# Patient Record
Sex: MALE | Race: WHITE | NOT HISPANIC OR LATINO | Employment: OTHER | URBAN - NONMETROPOLITAN AREA
[De-identification: names, ages, dates, MRNs, and addresses within clinical notes are randomized per-mention and may not be internally consistent; named-entity substitution may affect disease eponyms.]

---

## 2023-02-21 ENCOUNTER — OFFICE VISIT (OUTPATIENT)
Dept: FAMILY MEDICINE CLINIC | Facility: CLINIC | Age: 48
End: 2023-02-21
Payer: MEDICARE

## 2023-02-21 VITALS
OXYGEN SATURATION: 96 % | HEIGHT: 66 IN | DIASTOLIC BLOOD PRESSURE: 80 MMHG | BODY MASS INDEX: 22.5 KG/M2 | SYSTOLIC BLOOD PRESSURE: 112 MMHG | HEART RATE: 97 BPM | TEMPERATURE: 97.1 F | RESPIRATION RATE: 16 BRPM | WEIGHT: 140 LBS

## 2023-02-21 DIAGNOSIS — Z00.00 ENCOUNTER FOR MEDICAL EXAMINATION TO ESTABLISH CARE: Primary | ICD-10-CM

## 2023-02-21 DIAGNOSIS — Z00.00 ANNUAL PHYSICAL EXAM: ICD-10-CM

## 2023-02-21 DIAGNOSIS — R56.9 SEIZURES: ICD-10-CM

## 2023-02-21 DIAGNOSIS — M81.0 AGE-RELATED OSTEOPOROSIS WITHOUT CURRENT PATHOLOGICAL FRACTURE: ICD-10-CM

## 2023-02-21 DIAGNOSIS — R79.9 ABNORMAL FINDING OF BLOOD CHEMISTRY, UNSPECIFIED: ICD-10-CM

## 2023-02-21 DIAGNOSIS — F41.8 OTHER SPECIFIED ANXIETY DISORDERS: ICD-10-CM

## 2023-02-21 PROBLEM — J30.89 ENVIRONMENTAL AND SEASONAL ALLERGIES: Status: ACTIVE | Noted: 2023-02-21

## 2023-02-21 PROBLEM — H52.223 REGULAR ASTIGMATISM OF BOTH EYES: Status: ACTIVE | Noted: 2023-02-21

## 2023-02-21 PROBLEM — E78.49 OTHER HYPERLIPIDEMIA: Status: ACTIVE | Noted: 2023-02-21

## 2023-02-21 PROBLEM — G80.0 SPASTIC QUADRIPLEGIC CEREBRAL PALSY: Status: ACTIVE | Noted: 2023-02-21

## 2023-02-21 PROBLEM — F98.1 PSYCHOGENIC FECAL INCONTINENCE: Status: ACTIVE | Noted: 2023-02-21

## 2023-02-21 PROBLEM — F73 PROFOUND INTELLECTUAL DISABILITY: Status: ACTIVE | Noted: 2023-02-21

## 2023-02-21 PROCEDURE — 99386 PREV VISIT NEW AGE 40-64: CPT | Performed by: PSYCHOLOGIST

## 2023-02-21 PROCEDURE — 99213 OFFICE O/P EST LOW 20 MIN: CPT | Performed by: PSYCHOLOGIST

## 2023-02-21 RX ORDER — LORATADINE 10 MG/1
10 CAPSULE, LIQUID FILLED ORAL
COMMUNITY
End: 2023-02-21 | Stop reason: SDUPTHER

## 2023-02-21 RX ORDER — LEVETIRACETAM 750 MG/1
750 TABLET ORAL 2 TIMES DAILY
Qty: 60 TABLET | Refills: 2 | Status: SHIPPED | OUTPATIENT
Start: 2023-02-21 | End: 2023-05-22

## 2023-02-21 RX ORDER — LORATADINE 10 MG/1
10 CAPSULE, LIQUID FILLED ORAL
Qty: 30 EACH | Refills: 2 | Status: SHIPPED | OUTPATIENT
Start: 2023-02-21

## 2023-02-21 RX ORDER — BUSPIRONE HYDROCHLORIDE 7.5 MG/1
7.5 TABLET ORAL 2 TIMES DAILY
COMMUNITY
End: 2023-02-21 | Stop reason: SDUPTHER

## 2023-02-21 RX ORDER — ZONISAMIDE 100 MG/1
100 CAPSULE ORAL 2 TIMES DAILY
Qty: 60 CAPSULE | Refills: 2 | Status: SHIPPED | OUTPATIENT
Start: 2023-02-21 | End: 2023-05-22

## 2023-02-21 RX ORDER — ZONISAMIDE 100 MG/1
100 CAPSULE ORAL 2 TIMES DAILY
COMMUNITY
End: 2023-02-21 | Stop reason: SDUPTHER

## 2023-02-21 RX ORDER — DIVALPROEX SODIUM 500 MG/1
500 TABLET, DELAYED RELEASE ORAL 2 TIMES DAILY
COMMUNITY
End: 2023-02-21 | Stop reason: SDUPTHER

## 2023-02-21 RX ORDER — LEVETIRACETAM 750 MG/1
750 TABLET ORAL 2 TIMES DAILY
COMMUNITY
End: 2023-02-21 | Stop reason: SDUPTHER

## 2023-02-21 RX ORDER — DIVALPROEX SODIUM 500 MG/1
500 TABLET, DELAYED RELEASE ORAL 2 TIMES DAILY
Qty: 60 TABLET | Refills: 2 | Status: SHIPPED | OUTPATIENT
Start: 2023-02-21 | End: 2023-03-16 | Stop reason: SDUPTHER

## 2023-02-21 RX ORDER — BUSPIRONE HYDROCHLORIDE 7.5 MG/1
7.5 TABLET ORAL 2 TIMES DAILY
Qty: 60 TABLET | Refills: 2 | Status: SHIPPED | OUTPATIENT
Start: 2023-02-21 | End: 2023-05-22

## 2023-02-21 RX ORDER — LORATADINE 10 MG/1
10 TABLET ORAL DAILY
COMMUNITY
End: 2023-02-21

## 2023-02-21 NOTE — PROGRESS NOTES
"Chief Complaint  Establish Care (Needs refill on rx and protocol updated.)    Subjective        Siddhartha Guzman presents to [facility] PRIMARY [facility]   C/o establish care as his PCP 2. Annual Physical 3. Chronic illness -- MENTAL DISABILITY  Seizures   This is a chronic problem. The problem has been gradually worsening. There were 6 to 10 seizures. The most recent episode lasted 30 to 120 seconds. Characteristics include eye blinking, bladder incontinence and rhythmic jerking. Characteristics do not include eye deviation or bowel incontinence. The episode was witnessed. The seizures did not continue in the ED. The seizure(s) had no focality. There has been no fever.       Objective   Vital Signs:  /80 (BP Location: Left arm, Patient Position: Sitting, Cuff Size: Adult)   Pulse 97   Temp 97.1 °F (36.2 °C) (Temporal)   Resp 16   Ht 167.6 cm (66\")   Wt 63.5 kg (140 lb)   SpO2 96%   BMI 22.60 kg/m²   Estimated body mass index is 22.6 kg/m² as calculated from the following:    Height as of this encounter: 167.6 cm (66\").    Weight as of this encounter: 63.5 kg (140 lb).    BMI is within normal parameters. No other follow-up for BMI required.      Physical Exam  Vitals and nursing note reviewed. Exam conducted with a chaperone present.   Constitutional:       General: He is awake.      Appearance: He is well-developed, well-groomed and normal weight.   HENT:      Head: Normocephalic and atraumatic.      Jaw: There is normal jaw occlusion.      Right Ear: Hearing, tympanic membrane, ear canal and external ear normal.      Left Ear: Hearing, tympanic membrane, ear canal and external ear normal.      Nose: Nose normal.      Mouth/Throat:      Lips: Pink.      Mouth: Mucous membranes are moist.      Pharynx: Oropharynx is clear.   Eyes:      General: Lids are normal. Vision grossly intact. Gaze aligned appropriately.      Extraocular Movements: Extraocular movements intact.      Conjunctiva/sclera: " Conjunctivae normal.      Pupils: Pupils are equal, round, and reactive to light.     Neck:      Trachea: Trachea and phonation normal.   Cardiovascular:      Rate and Rhythm: Normal rate and regular rhythm.      Pulses: Normal pulses.      Heart sounds: Normal heart sounds.   Pulmonary:      Effort: Pulmonary effort is normal.      Breath sounds: Normal breath sounds and air entry.   Abdominal:      General: Abdomen is flat. Bowel sounds are normal.      Palpations: Abdomen is soft.   Genitourinary:     Rectum: Normal.   Musculoskeletal:         General: Normal range of motion.      Right shoulder: Normal.      Left shoulder: Normal.      Right upper arm: Normal.      Left upper arm: Normal.      Right elbow: Normal.      Left elbow: Normal.      Right forearm: Normal.      Left forearm: Normal.      Right wrist: Normal.      Left wrist: Normal.      Right hand: Normal.      Left hand: Normal.      Cervical back: Normal, full passive range of motion without pain, normal range of motion and neck supple.      Thoracic back: Normal.      Lumbar back: Normal.      Right hip: Normal.      Left hip: Normal.      Right upper leg: Normal.      Left upper leg: Normal.      Right knee: Normal.      Left knee: Normal.      Right lower leg: Normal. No edema.      Left lower leg: Normal. No edema.      Right ankle: Normal.      Right Achilles Tendon: Normal.      Left ankle: Normal.      Left Achilles Tendon: Normal.      Right foot: Normal.      Left foot: Normal.   Lymphadenopathy:      Cervical: No cervical adenopathy.   Skin:     General: Skin is warm.      Capillary Refill: Capillary refill takes less than 2 seconds.   Neurological:      Mental Status: He is alert.      Cranial Nerves: Cranial nerves 2-12 are intact.      Sensory: Sensation is intact.      Motor: Motor function is intact.      Coordination: Coordination is intact.      Gait: Gait is intact.      Deep Tendon Reflexes: Reflexes are normal and symmetric.    Psychiatric:         Attention and Perception: Attention and perception normal.         Mood and Affect: Affect normal.         Speech: He is noncommunicative.         Behavior: Behavior is cooperative.         Cognition and Memory: Cognition and memory normal.        Result Review :  The following data was reviewed by: Karlos Reilly MD on 02/21/2023:   LABS FROM PREV PCP TO REVIEW WHEN MED RECORDS ARE RREQUESTED      Assessment and Plan   Diagnoses and all orders for this visit:    1. Encounter for medical examination to establish care (Primary)    2. Annual physical exam  -     CBC & Differential  -     Comprehensive Metabolic Panel  -     Hemoglobin A1c  -     Hepatitis C Antibody  -     Lipid Panel  -     TSH  -     Vitamin D,25-Hydroxy  -     Vitamin B12  -     Midazolam 5 MG/0.1ML solution; 0.1 mL into the nostril(s) as directed by provider As Needed (SEIZURES) for up to 30 days. 1 spray for seizures lasting 3 minutes or 2 or more seizure within 4 hours  Dispense: 1 each; Refill: 2    3. Seizures (HCC)  Assessment & Plan:  LAST SEIZURE ACTIVITY 3 DAYS AGO / SEEN IN ER 2-3 WEEKS AGO     Orders:  -     CBC & Differential  -     Comprehensive Metabolic Panel  -     Hemoglobin A1c  -     Hepatitis C Antibody  -     Lipid Panel  -     TSH  -     Vitamin D,25-Hydroxy  -     Vitamin B12  -     Midazolam 5 MG/0.1ML solution; 0.1 mL into the nostril(s) as directed by provider As Needed (SEIZURES) for up to 30 days. 1 spray for seizures lasting 3 minutes or 2 or more seizure within 4 hours  Dispense: 1 each; Refill: 2    4. Abnormal finding of blood chemistry, unspecified  -     Hemoglobin A1c  -     Lipid Panel    5. Other specified anxiety disorders  -     TSH    6. Age-related osteoporosis without current pathological fracture  -     Vitamin D,25-Hydroxy    Other orders  -     zonisamide (Zonegran) 100 MG capsule; Take 1 capsule by mouth 2 (Two) Times a Day for 90 days.  Dispense: 60 capsule; Refill: 2  -      Loratadine (Claritin) 10 MG capsule; Take 1 capsule by mouth Daily With Breakfast.  Dispense: 30 each; Refill: 2  -     levETIRAcetam (KEPPRA) 750 MG tablet; Take 1 tablet by mouth 2 (Two) Times a Day for 90 days.  Dispense: 60 tablet; Refill: 2  -     divalproex (DEPAKOTE) 500 MG DR tablet; Take 1 tablet by mouth 2 (Two) Times a Day for 90 days.  Dispense: 60 tablet; Refill: 2  -     busPIRone (BUSPAR) 7.5 MG tablet; Take 1 tablet by mouth 2 (Two) Times a Day for 90 days.  Dispense: 60 tablet; Refill: 2  -     mupirocin (BACTROBAN) 2 % ointment; Apply 1 application topically to the appropriate area as directed 3 (Three) Times a Day for 10 days.  Dispense: 1 each; Refill: 0    I spent 20 minutes caring for Siddhartha on this date of service. This time includes time spent by me in the following activities:reviewing tests, performing a medically appropriate examination and/or evaluation , counseling and educating the patient/family/caregiver, ordering medications, tests, or procedures and documenting information in the medical record     The preventive exam has been reviewed in detail.  The patient has been fully counseled on preventative guidelines for vaccines, cancer screenings, and other health maintenance needs.   The patient has been counseled on guidelines for maintaining a lifestyle to promote good health and to minimize chronic diseases.  The patient has been assisted with scheduling these healthcare procedures for the coming year and given a written document of health maintenance and anticipatory guidance for age with the AVS.       Follow Up   Return in about 3 months (around 5/21/2023) for Recheck, RTC FASTING LABS & lab only in thursday .  Patient was given instructions and counseling regarding his condition or for health maintenance advice. Please see specific information pulled into the AVS if appropriate.           This document has been electronically signed by Karlos Reilly MD  February 21,  2023 14:15 EST

## 2023-02-27 ENCOUNTER — LAB (OUTPATIENT)
Dept: FAMILY MEDICINE CLINIC | Facility: CLINIC | Age: 48
End: 2023-02-27
Payer: MEDICARE

## 2023-02-27 LAB
ALBUMIN SERPL-MCNC: 4.5 G/DL (ref 3.5–5.2)
ALBUMIN/GLOB SERPL: 1.2 G/DL
ALP SERPL-CCNC: 87 U/L (ref 39–117)
ALT SERPL W P-5'-P-CCNC: 22 U/L (ref 1–41)
ANION GAP SERPL CALCULATED.3IONS-SCNC: 10.2 MMOL/L (ref 5–15)
AST SERPL-CCNC: 23 U/L (ref 1–40)
BASOPHILS # BLD AUTO: 0.02 10*3/MM3 (ref 0–0.2)
BASOPHILS NFR BLD AUTO: 0.4 % (ref 0–1.5)
BILIRUB SERPL-MCNC: 0.4 MG/DL (ref 0–1.2)
BUN SERPL-MCNC: 21 MG/DL (ref 6–20)
BUN/CREAT SERPL: 33.3 (ref 7–25)
CALCIUM SPEC-SCNC: 10.2 MG/DL (ref 8.6–10.5)
CHLORIDE SERPL-SCNC: 104 MMOL/L (ref 98–107)
CHOLEST SERPL-MCNC: 196 MG/DL (ref 0–200)
CO2 SERPL-SCNC: 25.8 MMOL/L (ref 22–29)
CREAT SERPL-MCNC: 0.63 MG/DL (ref 0.76–1.27)
DEPRECATED RDW RBC AUTO: 49.2 FL (ref 37–54)
EGFRCR SERPLBLD CKD-EPI 2021: 118.1 ML/MIN/1.73
EOSINOPHIL # BLD AUTO: 0.04 10*3/MM3 (ref 0–0.4)
EOSINOPHIL NFR BLD AUTO: 0.9 % (ref 0.3–6.2)
ERYTHROCYTE [DISTWIDTH] IN BLOOD BY AUTOMATED COUNT: 13.9 % (ref 12.3–15.4)
GLOBULIN UR ELPH-MCNC: 3.9 GM/DL
GLUCOSE SERPL-MCNC: 77 MG/DL (ref 65–99)
HBA1C MFR BLD: 4.3 % (ref 4.8–5.6)
HCT VFR BLD AUTO: 47.1 % (ref 37.5–51)
HCV AB SER DONR QL: NORMAL
HDLC SERPL-MCNC: 53 MG/DL (ref 40–60)
HGB BLD-MCNC: 15.2 G/DL (ref 13–17.7)
IMM GRANULOCYTES # BLD AUTO: 0.01 10*3/MM3 (ref 0–0.05)
IMM GRANULOCYTES NFR BLD AUTO: 0.2 % (ref 0–0.5)
LDLC SERPL CALC-MCNC: 127 MG/DL (ref 0–100)
LDLC/HDLC SERPL: 2.36 {RATIO}
LYMPHOCYTES # BLD AUTO: 2.23 10*3/MM3 (ref 0.7–3.1)
LYMPHOCYTES NFR BLD AUTO: 48 % (ref 19.6–45.3)
MCH RBC QN AUTO: 30.8 PG (ref 26.6–33)
MCHC RBC AUTO-ENTMCNC: 32.3 G/DL (ref 31.5–35.7)
MCV RBC AUTO: 95.3 FL (ref 79–97)
MONOCYTES # BLD AUTO: 0.31 10*3/MM3 (ref 0.1–0.9)
MONOCYTES NFR BLD AUTO: 6.7 % (ref 5–12)
NEUTROPHILS NFR BLD AUTO: 2.04 10*3/MM3 (ref 1.7–7)
NEUTROPHILS NFR BLD AUTO: 43.8 % (ref 42.7–76)
NRBC BLD AUTO-RTO: 0 /100 WBC (ref 0–0.2)
PLATELET # BLD AUTO: 186 10*3/MM3 (ref 140–450)
PMV BLD AUTO: 10 FL (ref 6–12)
POTASSIUM SERPL-SCNC: 3.8 MMOL/L (ref 3.5–5.2)
PROT SERPL-MCNC: 8.4 G/DL (ref 6–8.5)
RBC # BLD AUTO: 4.94 10*6/MM3 (ref 4.14–5.8)
SODIUM SERPL-SCNC: 140 MMOL/L (ref 136–145)
TRIGL SERPL-MCNC: 90 MG/DL (ref 0–150)
TSH SERPL DL<=0.05 MIU/L-ACNC: 4.25 UIU/ML (ref 0.27–4.2)
VLDLC SERPL-MCNC: 16 MG/DL (ref 5–40)
WBC NRBC COR # BLD: 4.65 10*3/MM3 (ref 3.4–10.8)

## 2023-02-27 PROCEDURE — 82607 VITAMIN B-12: CPT | Performed by: PSYCHOLOGIST

## 2023-02-27 PROCEDURE — 80061 LIPID PANEL: CPT | Performed by: PSYCHOLOGIST

## 2023-02-27 PROCEDURE — 86803 HEPATITIS C AB TEST: CPT | Performed by: PSYCHOLOGIST

## 2023-02-27 PROCEDURE — 85025 COMPLETE CBC W/AUTO DIFF WBC: CPT | Performed by: PSYCHOLOGIST

## 2023-02-27 PROCEDURE — 80053 COMPREHEN METABOLIC PANEL: CPT | Performed by: PSYCHOLOGIST

## 2023-02-27 PROCEDURE — 83036 HEMOGLOBIN GLYCOSYLATED A1C: CPT | Performed by: PSYCHOLOGIST

## 2023-02-27 PROCEDURE — 82306 VITAMIN D 25 HYDROXY: CPT | Performed by: PSYCHOLOGIST

## 2023-02-27 PROCEDURE — 84443 ASSAY THYROID STIM HORMONE: CPT | Performed by: PSYCHOLOGIST

## 2023-02-28 LAB
25(OH)D3 SERPL-MCNC: 30.7 NG/ML (ref 30–100)
VIT B12 BLD-MCNC: 755 PG/ML (ref 211–946)

## 2023-03-14 ENCOUNTER — TELEPHONE (OUTPATIENT)
Dept: FAMILY MEDICINE CLINIC | Facility: CLINIC | Age: 48
End: 2023-03-14

## 2023-03-14 NOTE — TELEPHONE ENCOUNTER
Pharmacy Name:  [facility]     Pharmacy representative phone number:   [phone]    What medication are you calling in regards to:  divalproex (DEPAKOTE) 500 MG DR tablet    What question does the pharmacy have:  THE DEPAKOTE WAS WRITTEN FOR THE DR AND IT NORMALLY  IS FOR THE XR     Who is the provider that prescribed the medication:  DR WHITAKER     Additional notes: PLEASE SEND NEW PRESCRIPTION

## 2023-03-16 RX ORDER — DIVALPROEX SODIUM 500 MG/1
500 TABLET, DELAYED RELEASE ORAL 2 TIMES DAILY
Qty: 60 TABLET | Refills: 2 | Status: SHIPPED | OUTPATIENT
Start: 2023-03-16 | End: 2023-06-14

## 2023-03-17 ENCOUNTER — TELEPHONE (OUTPATIENT)
Dept: FAMILY MEDICINE CLINIC | Facility: CLINIC | Age: 48
End: 2023-03-17
Payer: MEDICARE

## 2023-03-17 NOTE — TELEPHONE ENCOUNTER
Attempted to call pt's guardian to inform her that Dr. Reilly switched pt's Depakote rx at the pharmacy to the correct one. Okay for hub to relay info.

## 2024-04-15 ENCOUNTER — TELEPHONE (OUTPATIENT)
Dept: GASTROENTEROLOGY | Facility: CLINIC | Age: 49
End: 2024-04-15
Payer: MEDICARE

## 2024-04-15 NOTE — TELEPHONE ENCOUNTER
Dr. Ricketts, I received this referral in my workque and the patient's diagnosis is Liver Lesion. I was just wondering if patient can wait til August or, would u want them to see Maryana?    Please and Thank you

## 2024-04-29 ENCOUNTER — OFFICE VISIT (OUTPATIENT)
Dept: GASTROENTEROLOGY | Facility: CLINIC | Age: 49
End: 2024-04-29
Payer: MEDICARE

## 2024-04-29 VITALS — WEIGHT: 138 LBS | HEIGHT: 66 IN | BODY MASS INDEX: 22.18 KG/M2

## 2024-04-29 DIAGNOSIS — K76.0 FATTY LIVER: ICD-10-CM

## 2024-04-29 DIAGNOSIS — R53.81 MALAISE AND FATIGUE: ICD-10-CM

## 2024-04-29 DIAGNOSIS — K76.9 LIVER LESION: Primary | ICD-10-CM

## 2024-04-29 DIAGNOSIS — R53.83 MALAISE AND FATIGUE: ICD-10-CM

## 2024-04-29 PROCEDURE — 80074 ACUTE HEPATITIS PANEL: CPT

## 2024-04-29 PROCEDURE — 82784 ASSAY IGA/IGD/IGG/IGM EACH: CPT

## 2024-04-29 PROCEDURE — 86258 DGP ANTIBODY EACH IG CLASS: CPT

## 2024-04-29 PROCEDURE — 86376 MICROSOMAL ANTIBODY EACH: CPT

## 2024-04-29 PROCEDURE — 82103 ALPHA-1-ANTITRYPSIN TOTAL: CPT

## 2024-04-29 PROCEDURE — 83540 ASSAY OF IRON: CPT

## 2024-04-29 PROCEDURE — 86231 EMA EACH IG CLASS: CPT

## 2024-04-29 PROCEDURE — 86015 ACTIN ANTIBODY EACH: CPT

## 2024-04-29 PROCEDURE — 82390 ASSAY OF CERULOPLASMIN: CPT

## 2024-04-29 PROCEDURE — 84466 ASSAY OF TRANSFERRIN: CPT

## 2024-04-29 PROCEDURE — 86381 MITOCHONDRIAL ANTIBODY EACH: CPT

## 2024-04-29 PROCEDURE — 80053 COMPREHEN METABOLIC PANEL: CPT

## 2024-04-29 PROCEDURE — 86364 TISS TRNSGLTMNASE EA IG CLAS: CPT

## 2024-04-29 PROCEDURE — 85025 COMPLETE CBC W/AUTO DIFF WBC: CPT

## 2024-04-29 PROCEDURE — 82728 ASSAY OF FERRITIN: CPT

## 2024-04-29 PROCEDURE — 86038 ANTINUCLEAR ANTIBODIES: CPT

## 2024-04-29 RX ORDER — ZONISAMIDE 100 MG/1
100 CAPSULE ORAL DAILY
COMMUNITY

## 2024-04-29 RX ORDER — DIVALPROEX SODIUM 500 MG/1
500 TABLET, EXTENDED RELEASE ORAL DAILY
COMMUNITY

## 2024-04-29 RX ORDER — LEVOTHYROXINE SODIUM 0.03 MG/1
25 TABLET ORAL
COMMUNITY

## 2024-04-29 RX ORDER — MIDAZOLAM 5 MG/.1ML
SPRAY NASAL
COMMUNITY

## 2024-04-29 RX ORDER — BUSPIRONE HYDROCHLORIDE 7.5 MG/1
7.5 TABLET ORAL 3 TIMES DAILY
COMMUNITY

## 2024-04-29 RX ORDER — MONTELUKAST SODIUM 10 MG/1
10 TABLET ORAL NIGHTLY
COMMUNITY

## 2024-04-29 NOTE — PROGRESS NOTES
Date of Consultation:  4/29/2024  Referring Physician: No ref. provider found    Chief Complaint  liver lesion    Siddhartha Guzman is a 48 y.o. male who presents today to [facility] for liver lesion.    HPI:   48-year-old male with past medical history of cerebral palsy and seizure disorder who presented today for consult of liver lesion.  CTA chest with and without contrast was performed at [facility] in [facility] on February 26, 2024.  This is notable for heterogeneous appearance of the liver with enhancing lesion along the lateral right lobe of the liver measuring 3 cm with concern for some degree of fatty infiltration.  Labs performed by his PCP in February 2024 was notable for hyperlipidemia with a total cholesterol of 240 and .  Platelets at this time were 313.  Repeat labs in March 2024 were notable for normal LFTs with AST 20, AST 59.  CBC was notable for thrombocytopenia with platelets of 124.  Patient was referred to [facility] gastroenterology for further workup and management of liver lesion.    Of note, patient has cerebral palsy and is incapable of serving as historian.  Caretakers, Poonam and son, accompany him to this visit.  However, these caretakers have only been with Mr. Guzman for the past month.  They are not familiar with his history outside of that timeframe.  They are unsure as asked to why they were referred to gastroenterology.  Per Poonam, patient has not had any weight loss, abdominal distention, abdominal pain, jaundice, rash, itching, increasing confusion, agitation, melena, hematochezia, change in bowel habits, nausea, or vomiting.  Patient has a bowel movement daily with BSS 3.  His appetite is good.  They are unsure if he has had colonoscopy in the past.      Previous History:   Past Medical History:   Diagnosis Date    Cerebral palsy     Seizures       History reviewed. No pertinent surgical history.   Social  "History     Socioeconomic History    Marital status: Single   Tobacco Use    Smoking status: Never    Smokeless tobacco: Never   Substance and Sexual Activity    Alcohol use: Never    Drug use: Never    Sexual activity: Defer        Current Medications:  Current Outpatient Medications   Medication Sig Dispense Refill    busPIRone (BUSPAR) 7.5 MG tablet Take 1 tablet by mouth 3 (Three) Times a Day.      cloBAZam 10 MG film Take  by mouth.      cloBAZam 20 MG film Take  by mouth.      divalproex (DEPAKOTE ER) 500 MG 24 hr tablet Take 1 tablet by mouth Daily.      levETIRAcetam 1000 MG Tablet 3DP Take  by mouth.      levothyroxine (SYNTHROID, LEVOTHROID) 25 MCG tablet Take 1 tablet by mouth Every Morning.      Loratadine (Claritin) 10 MG capsule Take 1 capsule by mouth Daily With Breakfast. 30 each 2    Midazolam (Nayzilam) 5 MG/0.1ML solution into the nostril(s) as directed by provider.      montelukast (SINGULAIR) 10 MG tablet Take 1 tablet by mouth Every Night.      zonisamide (ZONEGRAN) 100 MG capsule Take 1 capsule by mouth Daily.       No current facility-administered medications for this visit.       Allergies:   Allergies   Allergen Reactions    Penicillins Hives       Vitals:   Ht 167.6 cm (66\")   Wt 62.6 kg (138 lb)   BMI 22.27 kg/m²   Estimated body mass index is 22.27 kg/m² as calculated from the following:    Height as of this encounter: 167.6 cm (66\").    Weight as of this encounter: 62.6 kg (138 lb).    Physical Exam:   Physical Exam  Constitutional:       General: He is not in acute distress.     Appearance: Normal appearance. He is normal weight.   HENT:      Mouth/Throat:      Mouth: Mucous membranes are moist.   Eyes:      Extraocular Movements: Extraocular movements intact.   Cardiovascular:      Rate and Rhythm: Normal rate and regular rhythm.      Pulses: Normal pulses.      Heart sounds: Normal heart sounds.   Pulmonary:      Effort: Pulmonary effort is normal.      Breath sounds: Normal breath " sounds.   Abdominal:      General: Abdomen is flat. Bowel sounds are normal.      Palpations: Abdomen is soft.   Musculoskeletal:         General: Normal range of motion.   Skin:     General: Skin is warm and dry.   Neurological:      General: No focal deficit present.      Comments: Wheel-chair dependent    Psychiatric:      Comments: Significant cognitive deficits s/t cerebral palsy           Lab Results:   Lab Results   Component Value Date    WBC 4.70 03/18/2023    HGB 14.4 03/18/2023    HCT 42.6 03/18/2023    MCV 92 03/18/2023    RDW 12.9 03/18/2023     03/18/2023    NEUTRORELPCT 43.8 02/27/2023    LYMPHORELPCT 48.0 (H) 02/27/2023    MONORELPCT 6.7 02/27/2023    EOSRELPCT 0.9 02/27/2023    BASORELPCT 0.4 02/27/2023    NEUTROABS 2.04 02/27/2023    LYMPHSABS 2.23 02/27/2023       Lab Results   Component Value Date     02/27/2023    K 3.8 02/27/2023    CO2 25.8 02/27/2023     02/27/2023    BUN 21 (H) 02/27/2023    CREATININE 0.63 (L) 02/27/2023    GLUCOSE 77 02/27/2023    CALCIUM 10.2 02/27/2023    ALKPHOS 87 02/27/2023    AST 23 02/27/2023    ALT 22 02/27/2023    BILITOT 0.4 02/27/2023    ALBUMIN 4.5 02/27/2023    PROTEINTOT 8.4 02/27/2023       Pathology:        Endoscopy:        Imaging:    CTA chest with and without:   Very heterogeneous appearance of the liver with an enhancing lesion in the lateral right lobe  of liver measuring 3 cm with concerns for a degree of fatty infiltration       Results review: During today's encounter, all relevant clinical data has been reviewed.      Assessment and Plan    1. Liver lesion (Primary)  2. Malaise and fatigue  3. Fatty liver  Caretakers deny any unintentional weight loss, abdominal distention, jaundice, abdominal pain, changes in appetite, N/V/D, constipation, melena, hematochezia, easy bleeding.  Caretakers have only been with patient approximately 1 month, unsure of history prior to their assumption of care  CTA chest with without was notable for  liver lesion and concerns for degree of fatty infiltration  Prior labs notable for hyperlipidemia which would put patient at risk for fatty liver disease.  Will obtain liver ultrasound  Will obtain labs as listed below  -     CBC & Differential  -     Comprehensive Metabolic Panel  -     Alpha - 1 - Antitrypsin  -     BRIANA  -     Anti-microsomal Antibody  -     Anti-Smooth Muscle Antibody Titer  -     Celiac Comprehensive Panel  -     Ceruloplasmin  -     Mitochondrial Antibodies, M2  -     Hepatitis Panel, Acute  -     Endomysial Antibody IgA  -     Ferritin  -     Iron Profile  -     US Liver; Future        New Medications:   No orders of the defined types were placed in this encounter.      Discontinued Medications:   Medications Discontinued During This Encounter   Medication Reason    busPIRone (BUSPAR) 7.5 MG tablet *Therapy completed    divalproex (DEPAKOTE) 500 MG DR tablet *Therapy completed    levETIRAcetam (KEPPRA) 750 MG tablet *Therapy completed    zonisamide (Zonegran) 100 MG capsule *Therapy completed        Visit Diagnoses:    ICD-10-CM ICD-9-CM   1. Liver lesion  K76.9 573.8   2. Malaise and fatigue  R53.81 780.79    R53.83    3. Fatty liver  K76.0 571.8          Follow Up:   No follow-ups on file.    The patient was in agreement with the plan and all questions were answered to patient's satisfaction.        This document has been electronically signed by Maryana Hayes PA-C   April 29, 2024 14:06 EDT    Dictated Utilizing Dragon Dictation: Part of this note may be an electronic transcription/translation of spoken language to printed text using the Dragon Dictation System.    CC:  No ref. provider found  Provider, No Known

## 2024-04-30 LAB
ALBUMIN SERPL-MCNC: 4.2 G/DL (ref 3.5–5.2)
ALBUMIN/GLOB SERPL: 1.2 G/DL
ALP SERPL-CCNC: 84 U/L (ref 39–117)
ALPHA1 GLOB MFR UR ELPH: 173 MG/DL (ref 90–200)
ALT SERPL W P-5'-P-CCNC: 15 U/L (ref 1–41)
ANION GAP SERPL CALCULATED.3IONS-SCNC: 11.6 MMOL/L (ref 5–15)
AST SERPL-CCNC: 16 U/L (ref 1–40)
BASOPHILS # BLD AUTO: 0.01 10*3/MM3 (ref 0–0.2)
BASOPHILS NFR BLD AUTO: 0.2 % (ref 0–1.5)
BILIRUB SERPL-MCNC: 0.3 MG/DL (ref 0–1.2)
BUN SERPL-MCNC: 13 MG/DL (ref 6–20)
BUN/CREAT SERPL: 25.5 (ref 7–25)
CALCIUM SPEC-SCNC: 10.1 MG/DL (ref 8.6–10.5)
CERULOPLASMIN SERPL-MCNC: 28 MG/DL (ref 16–31)
CHLORIDE SERPL-SCNC: 105 MMOL/L (ref 98–107)
CO2 SERPL-SCNC: 23.4 MMOL/L (ref 22–29)
CREAT SERPL-MCNC: 0.51 MG/DL (ref 0.76–1.27)
DEPRECATED RDW RBC AUTO: 46.7 FL (ref 37–54)
EGFRCR SERPLBLD CKD-EPI 2021: 125.1 ML/MIN/1.73
EOSINOPHIL # BLD AUTO: 0.02 10*3/MM3 (ref 0–0.4)
EOSINOPHIL NFR BLD AUTO: 0.3 % (ref 0.3–6.2)
ERYTHROCYTE [DISTWIDTH] IN BLOOD BY AUTOMATED COUNT: 13.7 % (ref 12.3–15.4)
FERRITIN SERPL-MCNC: 314 NG/ML (ref 30–400)
GLOBULIN UR ELPH-MCNC: 3.5 GM/DL
GLUCOSE SERPL-MCNC: 63 MG/DL (ref 65–99)
HAV IGM SERPL QL IA: NORMAL
HBV CORE IGM SERPL QL IA: NORMAL
HBV SURFACE AG SERPL QL IA: NORMAL
HCT VFR BLD AUTO: 43.3 % (ref 37.5–51)
HCV AB SER QL: NORMAL
HGB BLD-MCNC: 14.6 G/DL (ref 13–17.7)
IMM GRANULOCYTES # BLD AUTO: 0.03 10*3/MM3 (ref 0–0.05)
IMM GRANULOCYTES NFR BLD AUTO: 0.5 % (ref 0–0.5)
IRON 24H UR-MRATE: 87 MCG/DL (ref 59–158)
IRON SATN MFR SERPL: 22 % (ref 20–50)
LYMPHOCYTES # BLD AUTO: 2.04 10*3/MM3 (ref 0.7–3.1)
LYMPHOCYTES NFR BLD AUTO: 33.7 % (ref 19.6–45.3)
MCH RBC QN AUTO: 31.2 PG (ref 26.6–33)
MCHC RBC AUTO-ENTMCNC: 33.7 G/DL (ref 31.5–35.7)
MCV RBC AUTO: 92.5 FL (ref 79–97)
MONOCYTES # BLD AUTO: 0.48 10*3/MM3 (ref 0.1–0.9)
MONOCYTES NFR BLD AUTO: 7.9 % (ref 5–12)
NEUTROPHILS NFR BLD AUTO: 3.48 10*3/MM3 (ref 1.7–7)
NEUTROPHILS NFR BLD AUTO: 57.4 % (ref 42.7–76)
NRBC BLD AUTO-RTO: 0 /100 WBC (ref 0–0.2)
PLATELET # BLD AUTO: 226 10*3/MM3 (ref 140–450)
PMV BLD AUTO: 10.5 FL (ref 6–12)
POTASSIUM SERPL-SCNC: 4.4 MMOL/L (ref 3.5–5.2)
PROT SERPL-MCNC: 7.7 G/DL (ref 6–8.5)
RBC # BLD AUTO: 4.68 10*6/MM3 (ref 4.14–5.8)
SODIUM SERPL-SCNC: 140 MMOL/L (ref 136–145)
TIBC SERPL-MCNC: 402 MCG/DL (ref 298–536)
TRANSFERRIN SERPL-MCNC: 270 MG/DL (ref 200–360)
WBC NRBC COR # BLD AUTO: 6.06 10*3/MM3 (ref 3.4–10.8)

## 2024-05-01 LAB
ANA SER QL: NEGATIVE
ENDOMYSIUM IGA SER QL: NEGATIVE
ENDOMYSIUM IGA SER QL: NEGATIVE
GLIADIN PEPTIDE IGA SER-ACNC: 36 UNITS (ref 0–19)
GLIADIN PEPTIDE IGG SER-ACNC: 10 UNITS (ref 0–19)
IGA SERPL-MCNC: 329 MG/DL (ref 90–386)
LKM-1 AB SER-ACNC: 2.1 UNITS (ref 0–20)
MITOCHONDRIA M2 IGG SER-ACNC: <20 UNITS (ref 0–20)
SMA IGG SER-ACNC: 11 UNITS (ref 0–19)
TTG IGA SER-ACNC: <2 U/ML (ref 0–3)
TTG IGG SER-ACNC: 6 U/ML (ref 0–5)

## 2024-05-02 ENCOUNTER — TELEPHONE (OUTPATIENT)
Dept: GASTROENTEROLOGY | Facility: CLINIC | Age: 49
End: 2024-05-02
Payer: MEDICARE

## 2024-05-02 NOTE — TELEPHONE ENCOUNTER
----- Message from Mariposa YANCEY sent at 5/2/2024  8:36 AM EDT -----  Please let patient know that his labs all came back normal. We will continue with plan discussed in office.

## 2024-05-03 ENCOUNTER — PATIENT ROUNDING (BHMG ONLY) (OUTPATIENT)
Dept: GASTROENTEROLOGY | Facility: CLINIC | Age: 49
End: 2024-05-03
Payer: MEDICARE

## 2024-06-21 NOTE — PROGRESS NOTES
Chief Complaint  No chief complaint on file.    Siddhartha Guzman is a 48 y.o. male who presents today to [facility] for No chief complaint on file..    HPI:   48-year-old male with past medical history of cerebral palsy and seizure disorder who presented on last visit for consult of liver lesion.  CTA chest with and without contrast was performed at [facility] in [facility] on February 26, 2024.  This is notable for heterogeneous appearance of the liver with enhancing lesion along the lateral right lobe of the liver measuring 3 cm with concern for some degree of fatty infiltration.  Labs performed by his PCP in February 2024 was notable for hyperlipidemia with a total cholesterol of 240 and .  Platelets at this time were 313.  Repeat labs in March 2024 were notable for normal LFTs with AST 20, AST 59.  CBC was notable for thrombocytopenia with platelets of 124.  Patient was referred to [facility] gastroenterology for further workup and management of liver lesion.     Of note, patient has cerebral palsy and is incapable of serving as historian.  Caretakers, Poonam and son, accompany him to this visit.  However, these caretakers have only been with Mr. Guzman for the past month.  They are not familiar with his history outside of that timeframe.  They are unsure as asked to why they were referred to gastroenterology.  Per Poonam, patient has not had any weight loss, abdominal distention, abdominal pain, jaundice, rash, itching, increasing confusion, agitation, melena, hematochezia, change in bowel habits, nausea, or vomiting.  Patient has a bowel movement daily with BSS 3.  His appetite is good.  They are unsure if he has had colonoscopy in the past.           Interval History:    Last visit, all of patient's labs came back within normal limits.  Ultrasound liver was performed at [facility] in [facility].  We will try to  "retrieve these records.  Patient's caretaker, Poonam, is present with him today.  She reports that he has been doing well.  He is had a good appetite, no changes in his bowel movements, no signs of abdominal pain.  His weight has remained stable.  She denies fever, chills, abdominal pain, nausea, vomiting, hematochezia, hematemesis, and changes in bowel habits.    Caretaker, Poonam, is unaware of any prior colon screening including colonoscopy or cologuard.     Previous History:   Past Medical History:   Diagnosis Date    Cerebral palsy     Seizures       History reviewed. No pertinent surgical history.   Social History     Socioeconomic History    Marital status: Single   Tobacco Use    Smoking status: Never    Smokeless tobacco: Never   Substance and Sexual Activity    Alcohol use: Never    Drug use: Never    Sexual activity: Defer        Current Medications:  Current Outpatient Medications   Medication Sig Dispense Refill    busPIRone (BUSPAR) 7.5 MG tablet Take 1 tablet by mouth 3 (Three) Times a Day.      cloBAZam 10 MG film Take  by mouth.      cloBAZam 20 MG film Take  by mouth.      divalproex (DEPAKOTE ER) 500 MG 24 hr tablet Take 1 tablet by mouth Daily.      levETIRAcetam 1000 MG Tablet 3DP Take  by mouth.      levothyroxine (SYNTHROID, LEVOTHROID) 25 MCG tablet Take 1 tablet by mouth Every Morning.      Loratadine (Claritin) 10 MG capsule Take 1 capsule by mouth Daily With Breakfast. 30 each 2    Midazolam (Nayzilam) 5 MG/0.1ML solution into the nostril(s) as directed by provider.      montelukast (SINGULAIR) 10 MG tablet Take 1 tablet by mouth Every Night.      zonisamide (ZONEGRAN) 100 MG capsule Take 1 capsule by mouth Daily.       No current facility-administered medications for this visit.       Allergies:   Allergies   Allergen Reactions    Penicillins Hives       Vitals:   Ht 167.6 cm (66\")   Wt 62.6 kg (138 lb)   BMI 22.27 kg/m²   Estimated body mass index is 22.27 kg/m² as calculated from the " "following:    Height as of this encounter: 167.6 cm (66\").    Weight as of this encounter: 62.6 kg (138 lb).    ROS:   Review of Systems   Constitutional: Negative.    HENT: Negative.     Respiratory: Negative.     Cardiovascular: Negative.    Gastrointestinal: Negative.    Musculoskeletal: Negative.    Skin: Negative.    Psychiatric/Behavioral: Negative.     All other systems reviewed and are negative.       Physical Exam:   Physical Exam  Constitutional:       Appearance: Normal appearance.   HENT:      Head: Normocephalic and atraumatic.      Mouth/Throat:      Mouth: Mucous membranes are moist.   Eyes:      Extraocular Movements: Extraocular movements intact.   Cardiovascular:      Rate and Rhythm: Normal rate and regular rhythm.      Pulses: Normal pulses.      Heart sounds: Normal heart sounds.   Pulmonary:      Effort: Pulmonary effort is normal.      Breath sounds: Normal breath sounds.   Abdominal:      General: Abdomen is flat. Bowel sounds are normal. There is no distension.      Palpations: Abdomen is soft. There is no mass.      Tenderness: There is no abdominal tenderness. There is no guarding or rebound.      Hernia: No hernia is present.   Skin:     General: Skin is warm and dry.   Neurological:      Mental Status: He is alert. Mental status is at baseline.   Psychiatric:         Mood and Affect: Mood normal.         Behavior: Behavior normal.         Thought Content: Thought content normal.          Lab Results:   Lab Results   Component Value Date    WBC 6.06 04/29/2024    HGB 14.6 04/29/2024    HCT 43.3 04/29/2024    MCV 92.5 04/29/2024    RDW 13.7 04/29/2024     04/29/2024    NEUTRORELPCT 57.4 04/29/2024    LYMPHORELPCT 33.7 04/29/2024    MONORELPCT 7.9 04/29/2024    EOSRELPCT 0.3 04/29/2024    BASORELPCT 0.2 04/29/2024    NEUTROABS 3.48 04/29/2024    LYMPHSABS 2.04 04/29/2024       Lab Results   Component Value Date     04/29/2024    K 4.4 04/29/2024    CO2 23.4 04/29/2024     " 04/29/2024    BUN 13 04/29/2024    CREATININE 0.51 (L) 04/29/2024    GLUCOSE 63 (L) 04/29/2024    CALCIUM 10.1 04/29/2024    ALKPHOS 84 04/29/2024    AST 16 04/29/2024    ALT 15 04/29/2024    BILITOT 0.3 04/29/2024    ALBUMIN 4.2 04/29/2024    PROTEINTOT 7.7 04/29/2024       Pathology:        Endoscopy:        Imaging:       Results review: During today's encounter, all relevant clinical data has been reviewed.      Assessment and Plan    1. Fatty liver (Primary)  LFTs were WNL on last visit  Abdominal U/S obtained at [facility]. We will attempt to obtain these records for review.   Otherwise largely unremarkable for stigmata of liver disease.   Will plan to follow up in 6 months for repeat labs.  Of note, patient is due for colorectal screening.  Patient's power of  is his Sister Oksana Guzman.  I did attempt to call Ms. Guzman several times in order to discuss obtaining colorectal cancer screening and was unable to reach her at the number listed.      New Medications:   No orders of the defined types were placed in this encounter.      Discontinued Medications:   There are no discontinued medications.     Visit Diagnoses:    ICD-10-CM ICD-9-CM   1. Fatty liver  K76.0 571.8            Follow Up:   No follow-ups on file.    The patient was in agreement with the plan and all questions were answered to patient's satisfaction.        This document has been electronically signed by Maryana Hayes PA-C   June 24, 2024 08:27 EDT    Dictated Utilizing Dragon Dictation: Part of this note may be an electronic transcription/translation of spoken language to printed text using the Dragon Dictation System.    CC:  No ref. provider found  Provider, No Known

## 2024-06-24 ENCOUNTER — OFFICE VISIT (OUTPATIENT)
Dept: GASTROENTEROLOGY | Facility: CLINIC | Age: 49
End: 2024-06-24
Payer: MEDICARE

## 2024-06-24 VITALS — BODY MASS INDEX: 22.18 KG/M2 | HEIGHT: 66 IN | WEIGHT: 138 LBS

## 2024-06-24 DIAGNOSIS — K76.0 FATTY LIVER: Primary | ICD-10-CM

## 2024-06-24 PROCEDURE — 1160F RVW MEDS BY RX/DR IN RCRD: CPT

## 2024-06-24 PROCEDURE — 1159F MED LIST DOCD IN RCRD: CPT

## 2024-06-24 PROCEDURE — 99213 OFFICE O/P EST LOW 20 MIN: CPT

## 2024-12-27 ENCOUNTER — OFFICE VISIT (OUTPATIENT)
Dept: GASTROENTEROLOGY | Facility: CLINIC | Age: 49
End: 2024-12-27
Payer: MEDICARE

## 2024-12-27 VITALS
HEIGHT: 66 IN | DIASTOLIC BLOOD PRESSURE: 82 MMHG | BODY MASS INDEX: 22.27 KG/M2 | HEART RATE: 89 BPM | SYSTOLIC BLOOD PRESSURE: 123 MMHG

## 2024-12-27 DIAGNOSIS — K76.0 FATTY LIVER: Primary | ICD-10-CM

## 2024-12-27 DIAGNOSIS — K59.00 CONSTIPATION, UNSPECIFIED CONSTIPATION TYPE: ICD-10-CM

## 2024-12-27 RX ORDER — LINACLOTIDE 145 UG/1
145 CAPSULE, GELATIN COATED ORAL
COMMUNITY
Start: 2024-12-24 | End: 2024-12-27

## 2024-12-27 NOTE — PROGRESS NOTES
Chief Complaint  Fatty Liver    Siddhartha Guzman is a 49 y.o. male who presents today to [facility] for Fatty Liver.    HPI:   48-year-old male with past medical history of cerebral palsy and seizure disorder who presented on last visit for consult of liver lesion.  CTA chest with and without contrast was performed at [facility] in [facility] on February 26, 2024.  This is notable for heterogeneous appearance of the liver with enhancing lesion along the lateral right lobe of the liver measuring 3 cm with concern for some degree of fatty infiltration.  Labs performed by his PCP in February 2024 was notable for hyperlipidemia with a total cholesterol of 240 and .  Platelets at this time were 313.  Repeat labs in March 2024 were notable for normal LFTs with AST 20, AST 59.  CBC was notable for thrombocytopenia with platelets of 124.  Patient was referred to [facility] gastroenterology for further workup and management of liver lesion.     Of note, patient has cerebral palsy and is incapable of serving as historian.  Caretakers, Poonam and son, accompany him to this visit.  However, these caretakers have only been with Mr. Guzman for the past month.  They are not familiar with his history outside of that timeframe.  They are unsure as asked to why they were referred to gastroenterology.  Per Poonam, patient has not had any weight loss, abdominal distention, abdominal pain, jaundice, rash, itching, increasing confusion, agitation, melena, hematochezia, change in bowel habits, nausea, or vomiting.  Patient has a bowel movement daily with BSS 3.  His appetite is good.  They are unsure if he has had colonoscopy in the past.     6/24/2024Last visit, all of patient's labs came back within normal limits.  Ultrasound liver was performed at [facility] in [facility].  We will try to retrieve these records.  Patient's caretaker, Poonam, is  present with him today.  She reports that he has been doing well.  He is had a good appetite, no changes in his bowel movements, no signs of abdominal pain.  His weight has remained stable.  She denies fever, chills, abdominal pain, nausea, vomiting, hematochezia, hematemesis, and changes in bowel habits.     Caretaker, Poonam, is unaware of any prior colon screening including colonoscopy or cologuard.               Interval History:    Today, patient presents for follow-up.  Patient is nonverbal secondary to cerebral palsy.  He has a new caretaker, Ivette,  who accompanies him today.  She reports that his last colonoscopy was performed in December 2023 by Dr. Anegl Marsh at [facility].  This was notable for several polyps and a repeat colonoscopy is recommended in 3 years, December 2026.  She reports that patient has been doing well.  He did have an episode of constipation and has subsequently been placed on Linzess 145 mcg by his PCP.  He reports that he is having 1 small-volume stool in the morning on 1 small-volume in the evening.  She feels that he is stool is still hard and is having incomplete evacuation of his colon.  She notes that he is tolerating feeds well.  No trouble swallowing.  No jaundice, abdominal distention, easy bleeding/bruising, or other stigmata of liver disease.      Previous History:   Past Medical History:   Diagnosis Date    Cerebral palsy     Seizures       History reviewed. No pertinent surgical history.   Social History     Socioeconomic History    Marital status: Single   Tobacco Use    Smoking status: Never    Smokeless tobacco: Never   Substance and Sexual Activity    Alcohol use: Never    Drug use: Never    Sexual activity: Defer        Current Medications:  Current Outpatient Medications   Medication Sig Dispense Refill    busPIRone (BUSPAR) 7.5 MG tablet Take 1 tablet by mouth 3 (Three) Times a Day.      cloBAZam 10 MG film Take  by mouth.      cloBAZam 20 MG  "film Take  by mouth.      divalproex (DEPAKOTE ER) 500 MG 24 hr tablet Take 1 tablet by mouth Daily.      levETIRAcetam 1000 MG Tablet 3DP Take  by mouth.      levothyroxine (SYNTHROID, LEVOTHROID) 25 MCG tablet Take 1 tablet by mouth Every Morning.      Loratadine (Claritin) 10 MG capsule Take 1 capsule by mouth Daily With Breakfast. 30 each 2    Midazolam (Nayzilam) 5 MG/0.1ML solution into the nostril(s) as directed by provider.      montelukast (SINGULAIR) 10 MG tablet Take 1 tablet by mouth Every Night.      zonisamide (ZONEGRAN) 100 MG capsule Take 1 capsule by mouth Daily.      linaclotide (Linzess) 290 MCG capsule capsule Take 1 capsule by mouth Daily. 30 minutes before meals on an empty stomach. 30 capsule 5     No current facility-administered medications for this visit.       Allergies:   Allergies   Allergen Reactions    Alpha-Gal Hives    Penicillins Hives       Vitals:   /82 (BP Location: Left arm, Patient Position: Sitting, Cuff Size: Large Adult)   Pulse 89   Ht 167.6 cm (66\")   BMI 22.27 kg/m²   Estimated body mass index is 22.27 kg/m² as calculated from the following:    Height as of this encounter: 167.6 cm (66\").    Weight as of 6/24/24: 62.6 kg (138 lb).    ROS:   Review of Systems   Constitutional: Negative.    HENT: Negative.     Respiratory: Negative.     Cardiovascular: Negative.    Gastrointestinal:  Positive for constipation. Negative for abdominal distention, abdominal pain, anal bleeding, blood in stool, diarrhea, nausea, rectal pain and vomiting.   All other systems reviewed and are negative.       Physical Exam:   Physical Exam  Vitals reviewed. Exam conducted with a chaperone present.   Constitutional:       General: He is not in acute distress.     Appearance: Normal appearance. He is well-groomed. He is not toxic-appearing.      Comments: Wheelchair-bound.   HENT:      Head: Normocephalic and atraumatic.      Mouth/Throat:      Mouth: Mucous membranes are moist.   Eyes:     "  Extraocular Movements: Extraocular movements intact.   Cardiovascular:      Rate and Rhythm: Normal rate and regular rhythm.      Heart sounds: Normal heart sounds. No murmur heard.  Pulmonary:      Effort: Pulmonary effort is normal. No respiratory distress.      Breath sounds: Normal breath sounds. No stridor. No wheezing or rales.   Abdominal:      General: Bowel sounds are normal. There is no distension.      Palpations: Abdomen is soft. There is no mass.      Tenderness: There is no abdominal tenderness. There is no guarding or rebound.      Hernia: No hernia is present.   Skin:     General: Skin is warm.      Coloration: Skin is not jaundiced.      Findings: No rash.   Neurological:      Mental Status: He is alert. Mental status is at baseline.   Psychiatric:         Mood and Affect: Mood and affect normal.         Speech: Speech normal.         Behavior: Behavior normal. Behavior is cooperative.         Thought Content: Thought content normal.          Lab Results:   Lab Results   Component Value Date    WBC 6.06 04/29/2024    HGB 14.6 04/29/2024    HCT 43.3 04/29/2024    MCV 92.5 04/29/2024    RDW 13.7 04/29/2024     04/29/2024    NEUTRORELPCT 57.4 04/29/2024    LYMPHORELPCT 33.7 04/29/2024    MONORELPCT 7.9 04/29/2024    EOSRELPCT 0.3 04/29/2024    BASORELPCT 0.2 04/29/2024    NEUTROABS 3.48 04/29/2024    LYMPHSABS 2.04 04/29/2024       Lab Results   Component Value Date     04/29/2024    K 4.4 04/29/2024    CO2 23.4 04/29/2024     04/29/2024    BUN 13 04/29/2024    CREATININE 0.51 (L) 04/29/2024    GLUCOSE 63 (L) 04/29/2024    CALCIUM 10.1 04/29/2024    ALKPHOS 84 04/29/2024    AST 16 04/29/2024    ALT 15 04/29/2024    BILITOT 0.3 04/29/2024    ALBUMIN 4.2 04/29/2024    PROTEINTOT 7.7 04/29/2024       Pathology:        Endoscopy:        Imaging:   No Images in the past 120 days found..      Results review: During today's encounter, all relevant clinical data has been reviewed.       Assessment and Plan    1. Fatty liver (Primary)  Educated patient on importance of weight loss, healthy diet, and daily exercise as this is essentially the only way to prevent progression of disease to cirrhosis. Encouraged patient to follow closely with PCP to develop an individualized plan for diet/exercise in order to promote healthy weight loss. Patient was advised that fatty infiltrate of the liver is not a benign condition and can lead to serious liver disease, even cirrhosis, possible liver transplant and hepatocellular carcinoma. Patient will have ultrasound of the liver at least annually and hepatic panel every 6 months for monitoring.  Will obtain CMP/CBC, will also obtain liver ultrasound.  Given the patient lives in [facility], will try to have this performed in [facility].  -     US Liver; Future  -     Comprehensive Metabolic Panel  -     CBC & Differential    2. Constipation, unspecified constipation type  Will increase Linzess to 290 mcg.  Please discontinue 145 mcg.  -     linaclotide (Linzess) 290 MCG capsule capsule; Take 1 capsule by mouth Daily. 30 minutes before meals on an empty stomach.  Dispense: 30 capsule; Refill: 5    New Medications:   New Medications Ordered This Visit   Medications    linaclotide (Linzess) 290 MCG capsule capsule     Sig: Take 1 capsule by mouth Daily. 30 minutes before meals on an empty stomach.     Dispense:  30 capsule     Refill:  5       Discontinued Medications:   Medications Discontinued During This Encounter   Medication Reason    Linzess 145 MCG capsule capsule Not Efficacious        Visit Diagnoses:    ICD-10-CM ICD-9-CM   1. Fatty liver  K76.0 571.8   2. Constipation, unspecified constipation type  K59.00 564.00            Follow Up:   Return in about 3 months (around 3/27/2025).    The patient was in agreement with the plan and all questions were answered to patient's satisfaction.        This document has been electronically signed by  Maryana Hayes PA-C   December 27, 2024 11:53 EST    Dictated Utilizing Dragon Dictation: Part of this note may be an electronic transcription/translation of spoken language to printed text using the Dragon Dictation System.    CC:  No ref. provider found  Provider, No Known

## 2025-02-17 ENCOUNTER — LAB (OUTPATIENT)
Dept: LAB | Facility: HOSPITAL | Age: 50
End: 2025-02-17
Payer: MEDICARE

## 2025-02-17 ENCOUNTER — HOSPITAL ENCOUNTER (OUTPATIENT)
Dept: ULTRASOUND IMAGING | Facility: HOSPITAL | Age: 50
Discharge: HOME | End: 2025-02-17
Payer: MEDICARE

## 2025-02-17 DIAGNOSIS — K76.0 FATTY LIVER: ICD-10-CM

## 2025-02-17 PROCEDURE — 76705 ECHO EXAM OF ABDOMEN: CPT | Performed by: RADIOLOGY

## 2025-02-17 PROCEDURE — 80053 COMPREHEN METABOLIC PANEL: CPT

## 2025-02-17 PROCEDURE — 76705 ECHO EXAM OF ABDOMEN: CPT

## 2025-02-17 PROCEDURE — 85025 COMPLETE CBC W/AUTO DIFF WBC: CPT

## 2025-02-18 ENCOUNTER — TELEPHONE (OUTPATIENT)
Dept: GASTROENTEROLOGY | Facility: CLINIC | Age: 50
End: 2025-02-18
Payer: MEDICARE

## 2025-02-18 LAB
ALBUMIN SERPL-MCNC: 4.2 G/DL (ref 3.5–5.2)
ALBUMIN/GLOB SERPL: 1.1 G/DL
ALP SERPL-CCNC: 167 U/L (ref 39–117)
ALT SERPL W P-5'-P-CCNC: 18 U/L (ref 1–41)
ANION GAP SERPL CALCULATED.3IONS-SCNC: 13 MMOL/L (ref 5–15)
AST SERPL-CCNC: 30 U/L (ref 1–40)
BASOPHILS # BLD AUTO: 0.01 10*3/MM3 (ref 0–0.2)
BASOPHILS NFR BLD AUTO: 0.2 % (ref 0–1.5)
BILIRUB SERPL-MCNC: 0.3 MG/DL (ref 0–1.2)
BUN SERPL-MCNC: 12 MG/DL (ref 6–20)
BUN/CREAT SERPL: 20 (ref 7–25)
CALCIUM SPEC-SCNC: 10.4 MG/DL (ref 8.6–10.5)
CHLORIDE SERPL-SCNC: 102 MMOL/L (ref 98–107)
CO2 SERPL-SCNC: 24 MMOL/L (ref 22–29)
CREAT SERPL-MCNC: 0.6 MG/DL (ref 0.76–1.27)
DEPRECATED RDW RBC AUTO: 47.9 FL (ref 37–54)
EGFRCR SERPLBLD CKD-EPI 2021: 118.3 ML/MIN/1.73
EOSINOPHIL # BLD AUTO: 0.03 10*3/MM3 (ref 0–0.4)
EOSINOPHIL NFR BLD AUTO: 0.6 % (ref 0.3–6.2)
ERYTHROCYTE [DISTWIDTH] IN BLOOD BY AUTOMATED COUNT: 14.3 % (ref 12.3–15.4)
GLOBULIN UR ELPH-MCNC: 3.9 GM/DL
GLUCOSE SERPL-MCNC: 84 MG/DL (ref 65–99)
HCT VFR BLD AUTO: 41.5 % (ref 37.5–51)
HGB BLD-MCNC: 13.9 G/DL (ref 13–17.7)
IMM GRANULOCYTES # BLD AUTO: 0.05 10*3/MM3 (ref 0–0.05)
IMM GRANULOCYTES NFR BLD AUTO: 1 % (ref 0–0.5)
LYMPHOCYTES # BLD AUTO: 1.02 10*3/MM3 (ref 0.7–3.1)
LYMPHOCYTES NFR BLD AUTO: 19.7 % (ref 19.6–45.3)
MCH RBC QN AUTO: 30.8 PG (ref 26.6–33)
MCHC RBC AUTO-ENTMCNC: 33.5 G/DL (ref 31.5–35.7)
MCV RBC AUTO: 92 FL (ref 79–97)
MONOCYTES # BLD AUTO: 0.46 10*3/MM3 (ref 0.1–0.9)
MONOCYTES NFR BLD AUTO: 8.9 % (ref 5–12)
NEUTROPHILS NFR BLD AUTO: 3.62 10*3/MM3 (ref 1.7–7)
NEUTROPHILS NFR BLD AUTO: 69.6 % (ref 42.7–76)
NRBC BLD AUTO-RTO: 0 /100 WBC (ref 0–0.2)
PLATELET # BLD AUTO: 259 10*3/MM3 (ref 140–450)
PMV BLD AUTO: 9.5 FL (ref 6–12)
POTASSIUM SERPL-SCNC: 4.2 MMOL/L (ref 3.5–5.2)
PROT SERPL-MCNC: 8.1 G/DL (ref 6–8.5)
RBC # BLD AUTO: 4.51 10*6/MM3 (ref 4.14–5.8)
SODIUM SERPL-SCNC: 139 MMOL/L (ref 136–145)
WBC NRBC COR # BLD AUTO: 5.19 10*3/MM3 (ref 3.4–10.8)

## 2025-02-18 NOTE — TELEPHONE ENCOUNTER
Spoke with patients care giver and gave her information. She was requesting results from his labs as well.

## 2025-02-18 NOTE — TELEPHONE ENCOUNTER
----- Message from Maryana Hayes sent at 2/17/2025 10:39 PM EST -----  Please let patient know that Abdominal US was notable for gallstones in his gallbladder. At time of visit, patient was asymptomatic. This is typically not to concerned with unless patient starts having abdominal pain. US also notable for fatty infiltration of the liver. Recommend diet regimen in order to promote healthy weight loss as this is essentially the only way to prevent progression to Cirrhosis.

## 2025-02-19 ENCOUNTER — TELEPHONE (OUTPATIENT)
Dept: GASTROENTEROLOGY | Facility: CLINIC | Age: 50
End: 2025-02-19
Payer: MEDICARE

## 2025-02-19 NOTE — TELEPHONE ENCOUNTER
----- Message from Maryana Hayes sent at 2/19/2025 11:32 AM EST -----  Please let patient that lab work was largely unremarkable. He does have a mildly elevated alkaline phosphatase. This is a nonspecific finding at this time. We will continue to monitor this closely.

## 2025-02-27 ENCOUNTER — TELEPHONE (OUTPATIENT)
Dept: CARDIOLOGY | Facility: CLINIC | Age: 50
End: 2025-02-27

## 2025-02-27 ENCOUNTER — OFFICE VISIT (OUTPATIENT)
Dept: CARDIOLOGY | Facility: CLINIC | Age: 50
End: 2025-02-27
Payer: MEDICARE

## 2025-02-27 VITALS
HEIGHT: 66 IN | HEART RATE: 68 BPM | WEIGHT: 170 LBS | DIASTOLIC BLOOD PRESSURE: 86 MMHG | OXYGEN SATURATION: 95 % | BODY MASS INDEX: 27.32 KG/M2 | SYSTOLIC BLOOD PRESSURE: 118 MMHG

## 2025-02-27 DIAGNOSIS — M79.89 SWELLING OF LOWER EXTREMITY: ICD-10-CM

## 2025-02-27 DIAGNOSIS — R06.02 SHORTNESS OF BREATH: Primary | ICD-10-CM

## 2025-02-27 DIAGNOSIS — R53.1 WEAKNESS: ICD-10-CM

## 2025-02-27 PROCEDURE — 93000 ELECTROCARDIOGRAM COMPLETE: CPT | Performed by: PHYSICIAN ASSISTANT

## 2025-02-27 PROCEDURE — 99204 OFFICE O/P NEW MOD 45 MIN: CPT | Performed by: PHYSICIAN ASSISTANT

## 2025-02-27 PROCEDURE — 1159F MED LIST DOCD IN RCRD: CPT | Performed by: PHYSICIAN ASSISTANT

## 2025-02-27 PROCEDURE — 1160F RVW MEDS BY RX/DR IN RCRD: CPT | Performed by: PHYSICIAN ASSISTANT

## 2025-02-27 RX ORDER — GLYCOPYRROLATE 1 MG/1
1 TABLET ORAL 2 TIMES DAILY
COMMUNITY

## 2025-02-27 RX ORDER — POTASSIUM CHLORIDE 750 MG/1
10 TABLET, EXTENDED RELEASE ORAL DAILY
COMMUNITY
Start: 2025-01-15

## 2025-02-27 RX ORDER — FUROSEMIDE 40 MG/1
40 TABLET ORAL DAILY
COMMUNITY
Start: 2024-10-08

## 2025-02-27 RX ORDER — CHOLECALCIFEROL (VITAMIN D3) 25 MCG
1000 CAPSULE ORAL DAILY
COMMUNITY
Start: 2025-02-13

## 2025-02-27 NOTE — PROGRESS NOTES
Subjective   Siddhartha Guzman is a 49 y.o. male     Chief Complaint   Patient presents with    Establish Care     Cardiac eval - edema in lower extremities  Patient unable to answer ros       HPI  The patient is a 49-year-old male who presents to the clinic today to establish care with the assistance of his primary caregiver.  Reportedly, the patient has cerebral palsy with subsequent intellectual disability.  The majority of history is given by the patient's caregiver.  This patient recently had to be taken to the hospital locally because of increasing dyspnea, weakness, and somewhat change in mental status.  Eventually he was noted to have dehydration, pneumonia, and acute renal insufficiency/hyponatremia.  He was admitted, treated accordingly, and has now been discharged.  He has now developed significant lower extremity edema and has ongoing dyspnea.  Cardiac evaluation has been recommended.  There is been no episodes of syncope.  There is no overt evidence of pulmonary edema by previous studies.  The patient has no further issues noted.      Current Outpatient Medications   Medication Sig Dispense Refill    busPIRone (BUSPAR) 7.5 MG tablet Take 1 tablet by mouth 2 (Two) Times a Day.      cloBAZam 20 MG film Take  by mouth. Twice a day      D3 High Potency 25 MCG (1000 UT) capsule Take 1 capsule by mouth Daily.      divalproex (DEPAKOTE ER) 500 MG 24 hr tablet Take 1 tablet by mouth 2 (Two) Times a Day.      furosemide (LASIX) 40 MG tablet Take 1 tablet by mouth Daily.      glycopyrrolate (ROBINUL) 1 MG tablet Take 1 tablet by mouth 2 (Two) Times a Day.      levETIRAcetam 1000 MG Tablet 3DP Take 1 tablet by mouth 2 (Two) Times a Day.      levothyroxine (SYNTHROID, LEVOTHROID) 25 MCG tablet Take 1 tablet by mouth Every Morning.      linaclotide (Linzess) 290 MCG capsule capsule Take 1 capsule by mouth Daily. 30 minutes before meals on an empty stomach. 30 capsule 5    Loratadine (Claritin) 10 MG capsule Take 1  "capsule by mouth Daily With Breakfast. 30 each 2    Midazolam (Nayzilam) 5 MG/0.1ML solution into the nostril(s) as directed by provider.      montelukast (SINGULAIR) 10 MG tablet Take 1 tablet by mouth Every Night.      potassium chloride 10 MEQ CR tablet Take 1 tablet by mouth Daily.      zonisamide (ZONEGRAN) 100 MG capsule Take 1 capsule by mouth 2 (Two) Times a Day.       No current facility-administered medications for this visit.       Alpha-gal and Penicillins    Past Medical History:   Diagnosis Date    Cerebral palsy     Dysphagia     Fatigue     Hyperlipidemia     Hypothyroidism     Liver lesion     Osteoporosis     Profound intellectual disability     Seizure     Seizures     Vitamin D deficiency        Social History     Socioeconomic History    Marital status: Single   Tobacco Use    Smoking status: Never    Smokeless tobacco: Never   Substance and Sexual Activity    Alcohol use: Never    Drug use: Never    Sexual activity: Defer       History reviewed. No pertinent family history.    Review of Systems   Reason unable to perform ROS: patient with cerebral palsey, unable to answer questions.       Objective     Vitals:    02/27/25 1056   BP: 118/86   Pulse: 68   SpO2: 95%   Weight: 77.1 kg (170 lb)   Height: 167.6 cm (66\")        /86   Pulse 68   Ht 167.6 cm (66\")   Wt 77.1 kg (170 lb)   SpO2 95%   BMI 27.44 kg/m²      Lab Results (most recent)       None            Physical Exam  Vitals and nursing note reviewed.   Constitutional:       General: He is not in acute distress (Patient is in wheel chair.).     Appearance: He is well-developed.   HENT:      Head: Normocephalic and atraumatic.   Eyes:      Conjunctiva/sclera: Conjunctivae normal.      Pupils: Pupils are equal, round, and reactive to light.   Neck:      Vascular: No JVD.      Trachea: No tracheal deviation.   Cardiovascular:      Rate and Rhythm: Normal rate and regular rhythm.      Heart sounds: Normal heart sounds.   Pulmonary: "      Effort: Pulmonary effort is normal.      Breath sounds: Normal breath sounds.   Abdominal:      General: Bowel sounds are normal. There is no distension.      Palpations: Abdomen is soft. There is no mass.      Tenderness: There is no abdominal tenderness. There is no guarding or rebound.   Musculoskeletal:         General: No tenderness or deformity. Normal range of motion.      Cervical back: Normal range of motion and neck supple.      Right lower le+      Left lower le+   Skin:     General: Skin is warm and dry.      Coloration: Skin is not pale.      Findings: No erythema or rash.   Neurological:      Mental Status: He is alert.         Procedure     ECG 12 Lead    Date/Time: 2025 11:07 AM  Performed by: Ibrahima Irvin PA    Authorized by: Ibrahima Irvin PA  Comparison: not compared with previous ECG   Comments: Sinus rhythm, prominent voltage, nonspecific ST and T wave changes, no acute changes noted.               Assessment & Plan      Diagnosis Plan   1. Shortness of breath  Adult Transthoracic Echo Complete W/ Cont if Necessary Per Protocol    Duplex Venous Lower Extremity - Bilateral CAR      2. Swelling of lower extremity  Adult Transthoracic Echo Complete W/ Cont if Necessary Per Protocol    Duplex Venous Lower Extremity - Bilateral CAR      3. Weakness          1.  The patient presents in the clinic today for establishment of care.  He presents with his caretaker by his side.  She contributes to the majority of the history, as the patient has cerebral palsy with intellectual disability noted.    2.  We will schedule for evaluation given his degree of bilateral lower extremity edema and even intermittent dyspnea and symptoms otherwise as above.  We will schedule for echo to evaluate systolic diastolic parameters, valvular morphologies, and right sided parameters.    4.  I would schedule for lower extremity venous duplex studies to bilateral lower extremities.  We can evaluate for  any significant anomalies or abnormalities otherwise.    5.  I would continue diuretic therapy.  We encouraged ongoing use of  compression hose therapy and physical maneuvers otherwise.    6.  Further pending all the above.  He will be returned to the clinic the course with any abnormalities or issues otherwise.           Siddhartha Megan  reports that he has never smoked. He has never used smokeless tobacco.       Electronically signed by:

## 2025-03-26 ENCOUNTER — HOSPITAL ENCOUNTER (OUTPATIENT)
Dept: CARDIOLOGY | Facility: HOSPITAL | Age: 50
Discharge: HOME | End: 2025-03-26
Payer: MEDICARE

## 2025-03-26 VITALS — BODY MASS INDEX: 27.32 KG/M2 | HEIGHT: 66 IN | WEIGHT: 169.97 LBS

## 2025-03-26 DIAGNOSIS — R06.02 SHORTNESS OF BREATH: ICD-10-CM

## 2025-03-26 DIAGNOSIS — M79.89 SWELLING OF LOWER EXTREMITY: ICD-10-CM

## 2025-03-26 LAB
AORTIC DIMENSIONLESS INDEX: 0.98 (DI)
AV MEAN PRESS GRAD SYS DOP V1V2: 1.3 MMHG
AV VMAX SYS DOP: 75.3 CM/SEC
BH CV ECHO MEAS - AO MAX PG: 2.27 MMHG
BH CV ECHO MEAS - AO ROOT DIAM: 2.49 CM
BH CV ECHO MEAS - AO V2 VTI: 12.2 CM
BH CV ECHO MEAS - EDV(CUBED): 44.9 ML
BH CV ECHO MEAS - ESV(CUBED): 14.5 ML
BH CV ECHO MEAS - FS: 31.3 %
BH CV ECHO MEAS - IVS/LVPW: 1.13 CM
BH CV ECHO MEAS - IVSD: 0.86 CM
BH CV ECHO MEAS - LA DIMENSION: 3.2 CM
BH CV ECHO MEAS - LAT PEAK E' VEL: 6 CM/SEC
BH CV ECHO MEAS - LV MASS(C)D: 79 GRAMS
BH CV ECHO MEAS - LV MAX PG: 1.75 MMHG
BH CV ECHO MEAS - LV MEAN PG: 0.94 MMHG
BH CV ECHO MEAS - LV V1 MAX: 66.2 CM/SEC
BH CV ECHO MEAS - LV V1 VTI: 12 CM
BH CV ECHO MEAS - LVIDD: 3.6 CM
BH CV ECHO MEAS - LVIDS: 2.44 CM
BH CV ECHO MEAS - LVPWD: 0.77 CM
BH CV ECHO MEAS - MED PEAK E' VEL: 4.6 CM/SEC
BH CV ECHO MEAS - MV A MAX VEL: 66 CM/SEC
BH CV ECHO MEAS - MV DEC SLOPE: 353.7 CM/SEC2
BH CV ECHO MEAS - MV E MAX VEL: 52.2 CM/SEC
BH CV ECHO MEAS - MV E/A: 0.79
BH CV ECHO MEAS - MV MAX PG: 4.4 MMHG
BH CV ECHO MEAS - MV MEAN PG: 1.42 MMHG
BH CV ECHO MEAS - MV P1/2T: 43.2 MSEC
BH CV ECHO MEAS - MV V2 VTI: 15.6 CM
BH CV ECHO MEAS - MVA(P1/2T): 5.1 CM2
BH CV ECHO MEAS - PA V2 MAX: 83.1 CM/SEC
BH CV ECHO MEAS - RV MAX PG: 1.27 MMHG
BH CV ECHO MEAS - RV V1 MAX: 56.4 CM/SEC
BH CV ECHO MEAS - RV V1 VTI: 10 CM
BH CV ECHO MEAS - TAPSE (>1.6): 1.78 CM
BH CV ECHO MEASUREMENTS AVERAGE E/E' RATIO: 9.85
BH CV XLRA - TDI S': 8.7 CM/SEC
LV EF 2D ECHO EST: 60 %

## 2025-03-26 PROCEDURE — 93306 TTE W/DOPPLER COMPLETE: CPT

## 2025-03-26 PROCEDURE — 93970 EXTREMITY STUDY: CPT

## 2025-03-27 ENCOUNTER — TELEPHONE (OUTPATIENT)
Dept: GASTROENTEROLOGY | Facility: CLINIC | Age: 50
End: 2025-03-27

## 2025-03-27 ENCOUNTER — OFFICE VISIT (OUTPATIENT)
Dept: GASTROENTEROLOGY | Facility: CLINIC | Age: 50
End: 2025-03-27
Payer: MEDICARE

## 2025-03-27 VITALS — BODY MASS INDEX: 27.64 KG/M2 | HEIGHT: 66 IN | WEIGHT: 172 LBS

## 2025-03-27 DIAGNOSIS — K76.0 FATTY LIVER: ICD-10-CM

## 2025-03-27 DIAGNOSIS — K80.80 BILIARY CALCULUS OF OTHER SITE WITHOUT OBSTRUCTION: Primary | ICD-10-CM

## 2025-03-27 DIAGNOSIS — K59.00 CONSTIPATION, UNSPECIFIED CONSTIPATION TYPE: ICD-10-CM

## 2025-03-27 DIAGNOSIS — K76.9 LIVER LESION: ICD-10-CM

## 2025-03-27 PROCEDURE — 99214 OFFICE O/P EST MOD 30 MIN: CPT

## 2025-03-27 PROCEDURE — 1160F RVW MEDS BY RX/DR IN RCRD: CPT

## 2025-03-27 PROCEDURE — 1159F MED LIST DOCD IN RCRD: CPT

## 2025-03-27 NOTE — TELEPHONE ENCOUNTER
Caller: BRITANY    Relationship to patient: DR. KIMBLE'S OFFICE    Best call back number: [phone]    Patient is needing: BRITANY ([phone]) FROM DR. KIMBLE'S OFFICE, RAJIV SENT REFERRAL OVER TO DR. KIMBLE. DR. KIMBLE'S OFFICE IS NEEDING OFFICE NOTES, AND ANY IMAGING OR LABS  FAXED OVER. FAX NUMBER [phone].

## 2025-03-27 NOTE — PROGRESS NOTES
Chief Complaint  Fatty liver    Siddhartha Guzman is a 49 y.o. male who presents today to [facility] for Fatty liver.    HPI:   48-year-old male with past medical history of cerebral palsy and seizure disorder who presented on last visit for consult of liver lesion.  CTA chest with and without contrast was performed at [facility] in [facility] on February 26, 2024.  This is notable for heterogeneous appearance of the liver with enhancing lesion along the lateral right lobe of the liver measuring 3 cm with concern for some degree of fatty infiltration.  Labs performed by his PCP in February 2024 was notable for hyperlipidemia with a total cholesterol of 240 and .  Platelets at this time were 313.  Repeat labs in March 2024 were notable for normal LFTs with AST 20, AST 59.  CBC was notable for thrombocytopenia with platelets of 124.  Patient was referred to [facility] gastroenterology for further workup and management of liver lesion.     Of note, patient has cerebral palsy and is incapable of serving as historian.  Caretakers, Poonam and son, accompany him to this visit.  However, these caretakers have only been with Mr. Guzman for the past month.  They are not familiar with his history outside of that timeframe.  They are unsure as asked to why they were referred to gastroenterology.  Per Poonam, patient has not had any weight loss, abdominal distention, abdominal pain, jaundice, rash, itching, increasing confusion, agitation, melena, hematochezia, change in bowel habits, nausea, or vomiting.  Patient has a bowel movement daily with BSS 3.  His appetite is good.  They are unsure if he has had colonoscopy in the past.     6/24/2024Last visit, all of patient's labs came back within normal limits.  Ultrasound liver was performed at [facility] in [facility].  We will try to retrieve these records.  Patient's caretaker, Poonam, is  present with him today.  She reports that he has been doing well.  He is had a good appetite, no changes in his bowel movements, no signs of abdominal pain.  His weight has remained stable.  She denies fever, chills, abdominal pain, nausea, vomiting, hematochezia, hematemesis, and changes in bowel habits.Caretaker, Poonam, is unaware of any prior colon screening including colonoscopy or cologuard.      12/27/2024: Today, patient presents for follow-up.  Patient is nonverbal secondary to cerebral palsy.  He has a new caretaker, Ivette,  who accompanies him today.  She reports that his last colonoscopy was performed in December 2023 by Dr. Angel Marsh at [facility].  This was notable for several polyps and a repeat colonoscopy is recommended in 3 years, December 2026.  She reports that patient has been doing well.  He did have an episode of constipation and has subsequently been placed on Linzess 145 mcg by his PCP.  He reports that he is having 1 small-volume stool in the morning and 1 small-volume in the evening.  She feels that his stool is still hard and is having incomplete evacuation of his colon.  She notes that he is tolerating feeds well.  No trouble swallowing.  No jaundice, abdominal distention, easy bleeding/bruising, or other stigmata of liver disease.  Linzess was increased to 290 mcg.  Abdominal ultrasound was performed on 2/17/2025 is notable for cholelithiasis and fatty infiltration of the liver.              Interval History:    Today, patient presents for follow-up.  Patient is nonverbal.  Patient's caretaker serves as historian.  Since initiating Linzess 290 mcg, he is having 1 bowel movement per day that is soft.  Patient has not indicated any significant abdominal pain.  She has noticed that he is leaning more to his right side and applying more pressure to his right side.  Ultrasound abdomen was notable for cholelithiasis.  She reports that his appetite has been unchanged.   Patient is still eating and drinking well.  Denies unintentional weight loss, nausea, vomiting, GERD, abdominal distention, melena, or hematochezia.        Previous History:   Past Medical History:   Diagnosis Date    Cerebral palsy     Dysphagia     Fatigue     Hyperlipidemia     Hypothyroidism     Liver lesion     Osteoporosis     Profound intellectual disability     Seizure     Seizures     Vitamin D deficiency       History reviewed. No pertinent surgical history.   Social History     Socioeconomic History    Marital status: Single   Tobacco Use    Smoking status: Never    Smokeless tobacco: Never   Substance and Sexual Activity    Alcohol use: Never    Drug use: Never    Sexual activity: Defer        Current Medications:  Current Outpatient Medications   Medication Sig Dispense Refill    busPIRone (BUSPAR) 7.5 MG tablet Take 1 tablet by mouth 2 (Two) Times a Day.      cloBAZam 20 MG film Take  by mouth. Twice a day      D3 High Potency 25 MCG (1000 UT) capsule Take 1 capsule by mouth Daily.      divalproex (DEPAKOTE ER) 500 MG 24 hr tablet Take 1 tablet by mouth 2 (Two) Times a Day.      furosemide (LASIX) 40 MG tablet Take 1 tablet by mouth Daily.      glycopyrrolate (ROBINUL) 1 MG tablet Take 1 tablet by mouth 2 (Two) Times a Day.      levETIRAcetam 1000 MG Tablet 3DP Take 1 tablet by mouth 2 (Two) Times a Day.      levothyroxine (SYNTHROID, LEVOTHROID) 25 MCG tablet Take 1 tablet by mouth Every Morning.      linaclotide (Linzess) 290 MCG capsule capsule Take 1 capsule by mouth Daily. 30 minutes before meals on an empty stomach. 30 capsule 5    Loratadine (Claritin) 10 MG capsule Take 1 capsule by mouth Daily With Breakfast. 30 each 2    Midazolam (Nayzilam) 5 MG/0.1ML solution into the nostril(s) as directed by provider.      montelukast (SINGULAIR) 10 MG tablet Take 1 tablet by mouth Every Night.      potassium chloride 10 MEQ CR tablet Take 1 tablet by mouth Daily.      zonisamide (ZONEGRAN) 100 MG capsule  "Take 1 capsule by mouth 2 (Two) Times a Day.       No current facility-administered medications for this visit.       Allergies:   Allergies   Allergen Reactions    Alpha-Gal Hives    Penicillins Hives       Vitals:   Ht 167.6 cm (66\")   Wt 78 kg (172 lb)   BMI 27.76 kg/m²   Estimated body mass index is 27.76 kg/m² as calculated from the following:    Height as of this encounter: 167.6 cm (66\").    Weight as of this encounter: 78 kg (172 lb).    ROS:   Review of Systems   Constitutional: Negative.    HENT: Negative.     Respiratory: Negative.     Cardiovascular: Negative.    Gastrointestinal:  Positive for abdominal pain. Negative for abdominal distention, anal bleeding, blood in stool, constipation, diarrhea, nausea and rectal pain.   All other systems reviewed and are negative.       Physical Exam:   Physical Exam  Vitals reviewed.   Constitutional:       General: He is not in acute distress.     Appearance: Normal appearance. He is well-groomed. He is not toxic-appearing.   HENT:      Head: Normocephalic and atraumatic.      Mouth/Throat:      Mouth: Mucous membranes are moist.   Eyes:      Extraocular Movements: Extraocular movements intact.   Cardiovascular:      Rate and Rhythm: Normal rate and regular rhythm.      Heart sounds: Normal heart sounds. No murmur heard.  Pulmonary:      Effort: Pulmonary effort is normal. No respiratory distress.      Breath sounds: Normal breath sounds. No stridor. No wheezing or rales.   Abdominal:      General: Bowel sounds are normal. There is no distension.      Palpations: Abdomen is soft. There is no mass.      Tenderness: There is no abdominal tenderness. There is no guarding or rebound.      Hernia: No hernia is present.   Skin:     General: Skin is warm.      Coloration: Skin is not jaundiced.      Findings: No rash.   Neurological:      Mental Status: He is alert and oriented to person, place, and time. Mental status is at baseline.   Psychiatric:         Mood and " Affect: Mood and affect normal.         Speech: Speech normal.         Behavior: Behavior is cooperative.          Lab Results:   Lab Results   Component Value Date    WBC 5.19 02/17/2025    HGB 13.9 02/17/2025    HCT 41.5 02/17/2025    MCV 92.0 02/17/2025    RDW 14.3 02/17/2025     02/17/2025    NEUTRORELPCT 69.6 02/17/2025    LYMPHORELPCT 19.7 02/17/2025    MONORELPCT 8.9 02/17/2025    EOSRELPCT 0.6 02/17/2025    BASORELPCT 0.2 02/17/2025    NEUTROABS 3.62 02/17/2025    LYMPHSABS 1.02 02/17/2025       Lab Results   Component Value Date     02/17/2025    K 4.2 02/17/2025    CO2 24.0 02/17/2025     02/17/2025    BUN 12 02/17/2025    CREATININE 0.60 (L) 02/17/2025    GLUCOSE 84 02/17/2025    CALCIUM 10.4 02/17/2025    ALKPHOS 167 (H) 02/17/2025    AST 30 02/17/2025    ALT 18 02/17/2025    BILITOT 0.3 02/17/2025    ALBUMIN 4.2 02/17/2025    PROTEINTOT 8.1 02/17/2025       Pathology:        Endoscopy:        Imaging:   US Abdomen Limited  Result Date: 2/17/2025  1. Cholelithiasis 2. Fatty infiltration of the liver   This report was finalized on 2/17/2025 2:14 PM by Dr. Stewart Wagoner MD.          Results review: During today's encounter, all relevant clinical data has been reviewed.      Assessment and Plan  1. Biliary calculus of other site without obstruction (Primary)  -     Ambulatory Referral to General Surgery    2. Constipation, unspecified constipation type  Well-controlled on Linzess 290 mcg daily.  Please continue    3. Fatty liver  4. Liver lesion  Educated patient on importance of weight loss, healthy diet, and daily exercise as this is essentially the only way to prevent progression of disease to cirrhosis. Encouraged patient to follow closely with PCP to develop an individualized plan for diet/exercise in order to promote healthy weight loss. Patient was advised that fatty infiltrate of the liver is not a benign condition and can lead to serious liver disease, even cirrhosis, possible  liver transplant and hepatocellular carcinoma. Patient will have ultrasound of the liver at least annually and hepatic panel every 6 months for monitoring.  Liver lesion not noted on most recent abdominal ultrasound.  Will plan to repeat imaging and labs on next visit.    New Medications:   No orders of the defined types were placed in this encounter.      Discontinued Medications:   There are no discontinued medications.     Visit Diagnoses:    ICD-10-CM ICD-9-CM   1. Biliary calculus of other site without obstruction  K80.80 574.20   2. Constipation, unspecified constipation type  K59.00 564.00   3. Fatty liver  K76.0 571.8   4. Liver lesion  K76.9 573.8            Follow Up:   Return in about 4 months (around 7/27/2025).    The patient was in agreement with the plan and all questions were answered to patient's satisfaction.        This document has been electronically signed by Maryana Farnsworth PA-C   March 27, 2025 14:30 EDT    Dictated Utilizing Dragon Dictation: Part of this note may be an electronic transcription/translation of spoken language to printed text using the Dragon Dictation System.    CC:  No ref. provider found  Yamileth Curran APRN

## 2025-04-07 ENCOUNTER — RESULTS FOLLOW-UP (OUTPATIENT)
Dept: CARDIOLOGY | Facility: CLINIC | Age: 50
End: 2025-04-07
Payer: MEDICARE

## 2025-04-07 NOTE — TELEPHONE ENCOUNTER
VENOUS  Pt notified of no acute findings. Provider will discuss results at f/u. Pt reminded of appt date and time.

## 2025-04-07 NOTE — TELEPHONE ENCOUNTER
----- Message from Ibrahima Irvin sent at 4/6/2025 11:09 PM EDT -----  Moderate septal hypertrophy but normal systolic function.  No evidence of dynamic LV outflow tract obstruction at rest.  Repeat an echo within a year.  Otherwise routine follow-up.  Blood pressure   control is indicated.  ----- Message -----  From: Christopher Fleming MD  Sent: 4/6/2025   2:06 PM EDT  To: JENNIFER Reveles      Adult Transthoracic Echo Complete W/ Cont if Necessary Per Protocol  Order: [id number]   Status: Final result       Dx: Shortness of breath; Swelling of lowe...    Test Result Released: No    Details    Reading Physician Reading Date Result Priority   Christopher Fleming MD  [phone]  4/6/2025 Routine     Result Text  Significantly technically limited study due to poor acoustic windows.     1.  LV size and global LV systolic function grossly preserved.  Visually estimated ejection fraction is greater than or equal to 50%.  There is moderate septal hypertrophy with no evidence of dynamic LV outflow tract obstruction at rest on limited Doppler sampling.  The atria are at the upper limits of normal size.  The right ventricle is poorly visualized.     2.  Limited echo and Doppler sampling fails to demonstrate significant morphologic or physiologic valve abnormalities.     3.  No pericardial or great vessel pathology.     4.  Right heart pressures cannot be calculated.        [facility] [address] [id number]

## 2025-04-07 NOTE — TELEPHONE ENCOUNTER
----- Message from Ling WANG sent at 4/7/2025 12:23 PM EDT -----    ----- Message -----  From: Ibrahima Irvin PA  Sent: 4/6/2025  11:09 PM EDT  To: Ling Hernandez MA    Routine follow-up.  ----- Message -----  From: Christopher Fleming MD  Sent: 4/6/2025   2:02 PM EDT  To: JNENIFER Reveles

## 2025-04-09 NOTE — TELEPHONE ENCOUNTER
Called CHANTELLE Patel regarding Echo result's no answer, left voice message requesting return call.

## 2025-04-10 NOTE — TELEPHONE ENCOUNTER
----- Message from Rebecca YANCEY sent at 4/10/2025  8:47 AM EDT -----    ----- Message -----  From: Ling Hernandez MA  Sent: 4/7/2025  12:38 PM EDT  To: Ling Hernandez MA      ----- Message -----  From: Ibrahima Irvin PA  Sent: 4/6/2025  11:09 PM EDT  To: Ling Hernandez MA    Moderate septal hypertrophy but normal systolic function.  No evidence of dynamic LV outflow tract obstruction at rest.  Repeat an echo within a year.  Otherwise routine follow-up.  Blood pressure   control is indicated.  ----- Message -----  From: Christopher Fleming MD  Sent: 4/6/2025   2:06 PM EDT  To: JENNIFER Reveles

## 2025-04-10 NOTE — TELEPHONE ENCOUNTER
I called and spoke with patient caregiver at facility [facility]. Moderate septal hypertrophy but normal systolic function.  No evidence of dynamic LV outflow tract obstruction at rest.  Repeat an echo within a year.  Otherwise routine follow-up.  Blood pressure   control is indicated.

## 2025-04-16 ENCOUNTER — TELEPHONE (OUTPATIENT)
Dept: GASTROENTEROLOGY | Facility: CLINIC | Age: 50
End: 2025-04-16

## 2025-05-07 ENCOUNTER — TELEPHONE (OUTPATIENT)
Dept: GASTROENTEROLOGY | Facility: CLINIC | Age: 50
End: 2025-05-07
Payer: MEDICARE

## 2025-05-07 DIAGNOSIS — K59.00 CONSTIPATION, UNSPECIFIED CONSTIPATION TYPE: ICD-10-CM
